# Patient Record
Sex: MALE
[De-identification: names, ages, dates, MRNs, and addresses within clinical notes are randomized per-mention and may not be internally consistent; named-entity substitution may affect disease eponyms.]

---

## 2017-12-13 NOTE — UC
Throat Pain/Nasal [place] HPI





- HPI Summary


HPI Summary: 





56 y/o male presents to the urgent care c/o sore throat on and off for the past 

2 weeks. Pt also states nasal congestion with clear nasal discharge. He has 

taken OTC decongestants. Pain is 2/10 and associated dry cough and B/L ear 

pressure at times. Pt denies fever, SOB, chest pain, N/V/D, ear pain.








- History of Current Complaint


Chief Complaint: UCRespiratory


Stated Complaint: SORE THROAT


Time Seen by Provider: 12/13/17 18:01


Hx Obtained From: Patient


Onset/Duration: Gradual Onset, Lasting Weeks - 2 weeks, Still Present, Worse 

Since - 2 days


Severity: Mild


Pain Intensity: 2


Pain Scale Used: 0-10 Numeric


Cough: Nonproductive


Associated Signs & Symptoms: Positive: Nasal Discharge - with clear nasal 

discharge.  Negative: Fever





- Epiglottits Risk Factors


Epiglottis Risk Factors: Negative





- Allergies/Home Medications


Allergies/Adverse Reactions: 


 Allergies











Allergy/AdvReac Type Severity Reaction Status Date / Time


 


No Known Allergies Allergy   Verified 12/13/17 18:00














PMH/Surg Hx/FS Hx/Imm Hx


Previously Healthy: Yes


Endocrine History: Dyslipidemia - on diet control





- Surgical History


Surgical History: None





- Family History


Known Family History: Positive: None - Pt denies PMHX





- Social History


Occupation: Employed Full-time


Lives: With Family


Alcohol Use: Occasionally


Substance Use Type: None


Smoking Status (MU): Never Smoked Tobacco





Review of Systems


Constitutional: Negative


Skin: Negative


Eyes: Negative


ENT: Sore Throat, Ear Ache - b/L ear pressure, Nasal Discharge - clear nasal 

discharge


Respiratory: Cough - dry


Cardiovascular: Negative


Gastrointestinal: Negative


Genitourinary: Negative


Motor: Negative


Neurovascular: Negative


Musculoskeletal: Negative


Neurological: Negative


Psychological: Negative


Is Patient Immunocompromised?: No


All Other Systems Reviewed And Are Negative: Yes





Physical Exam


Triage Information Reviewed: Yes


Vital Signs: 


 Initial Vital Signs











Temp  96.7 F   12/13/17 17:58














- Additional Comments


VITAL SIGNS: Reviewed. 


GENERAL: Patient is a well developed and nourished male  who is sitting 

comfortable in the examining table. Patient is not in any acute respiratory 

distress. 


HEAD AND FACE: No signs of trauma. No ecchymosis, hematomas or skull 

depressions. No sinus tenderness. 


EYES: PERRLA, EOMI x 2, No injected conjunctiva, no nystagmus. No photophobia.


EARS: Hearing grossly intact. Ear canals with moderate cerumen un able to 

visualize TM's   


MOUTH: Positive pharynx with mild erythema, no exudates, mil palatal petechiae. 

No B/L tonsillar enlargement . Uvula in midline. 


NECK: Supple, trachea is midline, Positive anterior cervical lymphadenopathy, 

no JVD, no carotid bruit, no c-spine tenderness, neck with full ROM. No 

meningeal signs, no Kernig's or brudzinskis signs. 


CHEST: Symmetric, no tenderness at palpation 


LUNGS: Clear to auscultation bilaterally. No wheezing or crackles.


CVS: Regular rate and rhythm, S1 and S2 present, no murmurs or gallops 

appreciated. 


ABDOMEN: Soft, non-tender. No signs of distention. No rebound no guarding, and 

no masses palpated. Bowel sounds are normal. 


EXTREMITIES: FROM in all major joints, no edema, no cyanosis or clubbing.


NEURO: Alert and oriented x 3. No acute neurological deficits. Speech is normal 

and follows commands. 


SKIN: Dry and warm 











Throat Pain/Nasal Course/Dx





- Course


Course Of Treatment: 56 y/o male presents to the urgent care c/o sore throat on 

and off for the past 2 weeks. Pt also states nasal congestion with clear nasal 

discharge. He has taken OTC decongestants. Pain is 2/10 and associated dry 

cough and B/L ear pressure at times. Pt denies fever, SOB, chest pain, N/V/D, 

ear pain.Hx obtained. Pt with pharyngitis and B/L external ear with moderate 

cerumen on examiantion. Pt's BP is elevated today advised to decrease salt in 

diet, monitor BP and f/u with PCP for further management.  Pt Rx ibuprofen PO 

and  to alleviate symptoms of pain and swelling. Also Rx Debrox otic drops. 

Advised on hand washing to avoid spreading. Pt advised to rest, eat well and 

avoid strenuous exercise. If symptoms do not improve or worsen advised to 

return to the urgent care or f/u with her PCP for further evaluation and 

treatment. Pt understood and agreed with D/C instructions.





- Differential Dx/Diagnosis


Differential Diagnosis/HQI/PQRI: Influenza, Laryngitis, Mononucleosis, Otitis 

Media, Pharyngitis, Sinusitis, Tonsillitis, URI


Provider Diagnoses: 1- Pharyngitis.  2-Bilaterally exteranal ear canals with 

cerumen impaction.  3- Elevated BP w/o Hx of HTN





Discharge





- Discharge Plan


Condition: Stable


Disposition: HOME


Prescriptions: 


Carbamide Peroxide 6.5% OTIC* [DEBROX 6.5% Otic*] 5 drop BOTH EARS BID #1 bottle


Fluticasone NASAL SPRAY 50MCG* [Flonase NASAL SPRAY 50MCG*] 2 spray BOTH NARES 

DAILY #1 btl


Ibuprofen TAB* [Motrin TAB* 600 MG] 600 mg PO Q6H PRN #20 tab


 PRN Reason: Pain


Patient Education Materials:  Pharyngitis (ED), Cerumen Impaction (ED), Low 

Sodium Diet (ED)


Referrals: 


[facility] [place] [facility] [Outside] - 1 Week


Additional Instructions: 


1-Please take ibuprofen PO q6-8hrs prn as instructed after meals to alleviate 

pain and swelling. Increase fluid intake, eat well, rest and avoid strenuous 

exercise


2- Use the flonase nasal spray and use saline drops to clear your sinuses


3- Use the Debrox to soften cerumen in both external ear canals


4-If symptoms do not improve or worsen please return to the urgent care or f/u 

with your PCP for further evaluation and treatment.


5-Your BP is elevated today. please decrease salt in your diet, monitor BP and 

if it continues to be elevated please f/u with your PCP for further management

## 2018-05-05 NOTE — UC
Eye Complaint HPI





- HPI Summary


HPI Summary: 





58 Y/O male presents with C/O L eye irritation and purulent discharge x 2 days. 

States increased irritation and symptoms starting in R eye. Denies visual 

disturbance or injury. Medical history and medications reviewed at this visit. 





- History of Current Complaint


Chief Complaint: UCEye


Time Seen by Provider: 05/05/18 09:25


Hx Obtained From: Patient


Onset/Duration: Gradual Onset


Severity Initially: Mild


Severity Currently: Mild


Pain Intensity: 2


Pain Scale Used: 0-10 Numeric


Aggravating Factor(s): Nothing


Alleviating Factor(s): Nothing


Associated Signs And Symptoms: Positive: Drainage (Purulent)





- Risk Factors


Penetrating Injury Risk Factor: Negative


Globe Rupture Risk Factors: Negative


Acute Glaucoma Risk Factors: Eye Inflammation


Optic Artery Occlusion Risk Factors: Negative





- Allergies/Home Medications


Allergies/Adverse Reactions: 


 Allergies











Allergy/AdvReac Type Severity Reaction Status Date / Time


 


No Known Allergies Allergy   Verified 05/05/18 09:17














PMH/Surg Hx/FS Hx/Imm Hx


Previously Healthy: Yes





- Surgical History


Surgical History: None





- Family History


Known Family History: Positive: None - Pt denies PMHX





- Social History


Alcohol Use: Daily


Alcohol Amount: 1 glass wine daily


Substance Use Type: None


Smoking Status (MU): Never Smoked Tobacco





Review of Systems


Constitutional: Negative


Skin: Negative


Eyes: Drainage, Eye Redness


ENT: Negative


Respiratory: Negative


Cardiovascular: Negative


Gastrointestinal: Negative


Genitourinary: Negative


Motor: Negative


Neurovascular: Negative


Musculoskeletal: Negative


Neurological: Negative


Psychological: Negative


Is Patient Immunocompromised?: No


All Other Systems Reviewed And Are Negative: Yes





Physical Exam





- Summary


Physical Exam Summary: 





Sclera red and irritated. small amount if purulent discharge L eye.


Triage Information Reviewed: Yes


Appearance: Well-Appearing


Vital Signs: 


 Initial Vital Signs











Temp  97.2 F   05/05/18 09:18


 


Pulse  64   05/05/18 09:18


 


Resp  14   05/05/18 09:18


 


BP  122/54   05/05/18 09:18


 


Pulse Ox  100   05/05/18 09:18











Eyes: Positive: Discharge


ENT Exam: Normal





Eye Complaint Course/Dx





- Differential Dx/Diagnosis


Differential Diagnosis/HQI/PQRI: Conjunctivitis


Provider Diagnoses: conjunctivitis





Discharge





- Sign-Out/Discharge


Documenting (check all that apply): Discharge/Admit/Transfer





- Discharge Plan


Condition: Critical


Disposition: HOME


Patient Education Materials:  Conjunctivitis (ED)


Referrals: 


No Primary Care Phys,NOPCP [Primary Care Provider] - 





- Billing Disposition and Condition


Condition: CRITICAL


Disposition: HOME

## 2018-07-19 ENCOUNTER — HOSPITAL ENCOUNTER (EMERGENCY)
Dept: HOSPITAL 25 - UCEAST | Age: 58
Discharge: HOME | End: 2018-07-19
Payer: COMMERCIAL

## 2018-07-19 VITALS — SYSTOLIC BLOOD PRESSURE: 123 MMHG | DIASTOLIC BLOOD PRESSURE: 86 MMHG

## 2018-07-19 DIAGNOSIS — L73.9: Primary | ICD-10-CM

## 2018-07-19 PROCEDURE — 87070 CULTURE OTHR SPECIMN AEROBIC: CPT

## 2018-07-19 PROCEDURE — 99212 OFFICE O/P EST SF 10 MIN: CPT

## 2018-07-19 PROCEDURE — 87641 MR-STAPH DNA AMP PROBE: CPT

## 2018-07-19 PROCEDURE — 87077 CULTURE AEROBIC IDENTIFY: CPT

## 2018-07-19 PROCEDURE — 87205 SMEAR GRAM STAIN: CPT

## 2018-07-19 PROCEDURE — G0463 HOSPITAL OUTPT CLINIC VISIT: HCPCS

## 2018-07-19 PROCEDURE — 87640 STAPH A DNA AMP PROBE: CPT

## 2018-07-19 NOTE — UC
Skin Complaint HPI





- HPI Summary


HPI Summary: 


This is vish De La Torre documenting for attending Antonieta Stevenson MD.





This patient is a 57 year old M presenting to [facility] with a chief complaint of 

small, painful area of erythema below the right buttock that began yesterday 

night. The patient rates the pain 2/10 in severity. Symptoms aggravated by 

nothing. Symptoms alleviated by nothing. Patient denies drainage from the site. 

He reports that he is concerned about a possible insect bite although has not 

seen any insects.  Patient reports he lives in an area where there are ticks, 

but denies recent tick removal.  Not Chicot compromise.  Tetanus up-to-date.  

Patient's medications reviewed this visit.








- History of Current Complaint


Chief Complaint: UCSkin


Time Seen by Provider: 07/19/18 14:34


Stated Complaint: BUG BITE


Hx Obtained From: Patient


Onset/Duration: Sudden Onset, Lasting Days, Still Present


Skin Exposure Onset/Duration: Days Ago


Timing: Constant


Onset Severity: Mild


Current Severity: Mild


Pain Intensity: 2


Pain Scale Used: 0-10 Numeric


Location: Other - Below right buttock


Character: Redness, Painful


Aggravating Factor(s): Nothing


Alleviating Factor(s): Nothing





- Allergy/Home Medications


Allergies/Adverse Reactions: 


 Allergies











Allergy/AdvReac Type Severity Reaction Status Date / Time


 


No Known Allergies Allergy   Verified 07/19/18 14:08














Review of Systems


Constitutional: Other - Negative fever


Skin: Other - Positive area of redness below right buttock. Negative drainage 

from site


All Other Systems Reviewed And Are Negative: Yes





PMH/Surg Hx/FS Hx/Imm Hx


Previously Healthy: Yes


Endocrine History: Other


Other Endocrine History: Negative diabetes


Cardiovascular History: Other


Other Cardiovascular History: negative hypertension





- Surgical History


Surgical History: None





- Family History


Known Family History: 


   Negative: Cardiac Disease, Diabetes





- Social History


Occupation: Employed Full-time


Lives: With Family


Alcohol Use: Daily


Alcohol Amount: 1 glass wine daily


Substance Use Type: None


Smoking Status (MU): Never Smoked Tobacco





Physical Exam





- Summary


Physical Exam Summary: 





Vital Signs Reviewed: Yes


A+Ox3, no distress


Eyes: Conjunctiva Clear


ENT: Hearing grossly normal  


neck: supple


Respiratory: Positive: No respiratory distress, No accessory muscle use 


Cardiovascular: skin color reflect adequate perfusion


Musculoskeletal Exam: KIRAN x 4 without difficulty 


Neurological: Positive: Alert,  ambulatory without difficulty


Psychological: Positive: Normal Response To Family


Skin: Positive: right upper posterior thigh - pt with quarter size erythema 

with small, white, pointing follicle in center. No induration, no fluctuance








Triage Information Reviewed: Yes


Vital Signs: 


 Initial Vital Signs











Temp  97.0 F   07/19/18 14:05


 


Pulse  71   07/19/18 14:05


 


Resp  12   07/19/18 14:05


 


BP  123/86   07/19/18 14:05


 


Pulse Ox  98   07/19/18 14:05














Course/Dx





- Course


Course Of Treatment: Patient presents emergency Department with inflamed 

follicle with even express purulent discharge.  Patient with mild discomfort.  

No bleeding.  Patient without history of similar.  Will place patient on short 

course of [place].  Culture taken and sent to lab for further sensitivity.  

Discussed with patient Motrin Tylenol, warm soaks, antibiotic complaints.  

Discussed with patient wound care signs and symptoms of infection as well as 

return precautions.  Patient comfortable and in agreement with plan.  No 

concern for tick bite at today's evaluation.





- Diagnoses


Provider Diagnoses: folliculitis





Discharge





- Sign-Out/Discharge


Documenting (check all that apply): Post-Discharge Follow Up





- Discharge Plan


Condition: Stable


Disposition: HOME


Prescriptions: 


DOXYcycline CAP(*) [DOXYcycline 100MG CAP(*)] 100 mg PO BID #14 cap


Patient Education Materials:  Folliculitis (ED)


Referrals: 


No Primary Care Phys,NOPCP [Primary Care Provider] - 


Additional Instructions: 


- Take antibiotics as prescribed until gone


- Apply warm wet washcloths to the inflamed area 2 times a day for 15 minutes 

at a time


- Okay to clean with warm soapy water. Pat dry.  Cover with an antibiotic 

ointment such as Neosporin and Polysporin.


Cover with Band-Aid


- Monitor year wound for signs of worsening infection.  Increased redness, red 

streaking, odor, increased pain, or fevers


- The sample from the wound has been sent for additional testing.  If you need 

a different antibiotic you will receive a call from her care team member.  This 

may take 2-3 days


- Contact your doctor or return questions or concerns





- Billing Disposition and Condition


Condition: STABLE


Disposition: Home

## 2019-10-05 ENCOUNTER — HOSPITAL ENCOUNTER (EMERGENCY)
Dept: HOSPITAL 25 - UCEAST | Age: 59
Discharge: HOME | End: 2019-10-05
Payer: COMMERCIAL

## 2019-10-05 VITALS — DIASTOLIC BLOOD PRESSURE: 80 MMHG | SYSTOLIC BLOOD PRESSURE: 130 MMHG

## 2019-10-05 DIAGNOSIS — H92.01: Primary | ICD-10-CM

## 2019-10-05 PROCEDURE — G0463 HOSPITAL OUTPT CLINIC VISIT: HCPCS

## 2019-10-05 PROCEDURE — 99213 OFFICE O/P EST LOW 20 MIN: CPT

## 2019-10-05 NOTE — UC
Ear Complaint HPI





- HPI Summary


HPI Summary: 





Healthy 58 yo with one week of progressive ear pain and pressure on the right 

side, without associated sore throat, headaches or URI symptoms. Hearing is not 

affected, no vertigo. 





- History of Current Complaint


Chief Complaint: UCEar


Stated Complaint: EARACHE


Time Seen by Provider: 10/05/19 16:51


Hx Obtained From: Patient


Onset/Duration: Gradual Onset, Lasting Days


Pain Intensity: 2


Alleviating Factors: Nothing - no analgeics used.





- Allergies/Home Medications


Allergies/Adverse Reactions: 


 Allergies











Allergy/AdvReac Type Severity Reaction Status Date / Time


 


No Known Allergies Allergy   Verified 10/05/19 16:45











Home Medications: 


 Home Medications





NK [No Home Medications Reported]  10/05/19 [History Confirmed 10/05/19]











PMH/Surg Hx/FS Hx/Imm Hx


Previously Healthy: Yes


Respiratory History: Other - Mild sleep apnea treated with a mandibular device.





- Surgical History


Surgical History: Yes


Surgery Procedure, Year, and Place: vasectomy, hemorroidectomy





- Family History


Known Family History: Positive: Non-Contributory





- Social History


Occupation: Employed Full-time


Lives: With Family


Alcohol Use: None


Alcohol Amount: 1 glass wine daily


Substance Use Type: None


Smoking Status (MU): Never Smoked Tobacco





Review of Systems


All Other Systems Reviewed And Are Negative: Yes


Constitutional: Positive: Negative


Skin: Positive: Negative


Eyes: Positive: Negative


ENT: Positive: Ear Ache, Other - ha been using a mandibular advance device for 

about 2 weeks to decrease snoring.


Respiratory: Positive: Negative


Cardiovascular: Positive: Negative


Gastrointestinal: Positive: Negative


Is Patient Immunocompromised?: No





Physical Exam


Triage Information Reviewed: Yes


Appearance: Well-Appearing, Pain Distress - mild


Vital Signs: 


 Initial Vital Signs











Temp  97 F   10/05/19 16:42


 


Pulse  71   10/05/19 16:42


 


Resp  16   10/05/19 16:42


 


BP  130/80   10/05/19 16:42


 


Pulse Ox  98   10/05/19 16:42











Eye Exam: Normal


ENT: Positive: Pharynx normal, Other - bilateral obstructive cerumen


Dental Exam: Normal


Neck exam: Normal


Neck: Positive: Supple, Nontender, No Lymphadenopathy


Respiratory: Positive: Lungs clear, Normal breath sounds


Cardiovascular: Positive: RRR, No Murmur


Neurological Exam: Normal


Psychological Exam: Normal


Skin Exam: Normal





Re-Evaluation





- Re-Evaluation


  ** First Eval


Re-Evaluation Time: 17:30


Change: Improved


Comment: Canals cleared of cerumen. Mild serous fluid and erythema of the right 

TM, no acute OM and no evidence of canal infection.





Ear Complaint Course/Dx





- Course


Course Of Treatment: 





Given overall normal TM, discussed possibility that origin of pain is from the 

TMJ and use of the mandibular advance device. 





- Differential Dx/Diagnosis


Differential Diagnosis/HQI/PQRI: Otitis Externa, Otitis Media, TMJ Syndrome, 

Other


Provider Diagnosis: 


 Otalgia of right ear








Discharge ED





- Sign-Out/Discharge


Documenting (check all that apply): Patient Departure


All imaging exams completed and their final reports reviewed: No Studies





- Discharge Plan


Condition: Good


Disposition: HOME


Patient Education Materials:  Earache (ED)


Referrals: 


Van Norton MD [Primary Care Provider] - 


Additional Instructions: 


Ear wax was removed today, without evidence of acute infection. There is mild 

redness of the ear, but this is most likely the result of irrigation. 


It is possible that your jaw is the source of pain, resulting from use of the 

mandibular advance device. You might try several days without use, using 

ibuprofen 600mg up to 3 times daily for relief of discomfort. 





- Billing Disposition and Condition


Condition: GOOD


Disposition: Home